# Patient Record
Sex: FEMALE | Race: WHITE | NOT HISPANIC OR LATINO | Employment: OTHER | URBAN - METROPOLITAN AREA
[De-identification: names, ages, dates, MRNs, and addresses within clinical notes are randomized per-mention and may not be internally consistent; named-entity substitution may affect disease eponyms.]

---

## 2017-10-04 ENCOUNTER — HISTORICAL (OUTPATIENT)
Dept: ADMINISTRATIVE | Facility: HOSPITAL | Age: 72
End: 2017-10-04

## 2017-10-04 ENCOUNTER — HISTORICAL (OUTPATIENT)
Dept: FAMILY MEDICINE | Facility: CLINIC | Age: 72
End: 2017-10-04

## 2017-10-04 LAB
ABS NEUT (OLG): 4.83 X10(3)/MCL (ref 2.1–9.2)
ALBUMIN SERPL-MCNC: 3.8 GM/DL (ref 3.4–5)
ALBUMIN/GLOB SERPL: 1 RATIO (ref 1–2)
ALP SERPL-CCNC: 119 UNIT/L (ref 45–117)
ALT SERPL-CCNC: 16 UNIT/L (ref 12–78)
AST SERPL-CCNC: 16 UNIT/L (ref 15–37)
BASOPHILS # BLD AUTO: 0.05 X10(3)/MCL
BASOPHILS NFR BLD AUTO: 1 % (ref 0–1)
BILIRUB SERPL-MCNC: 0.4 MG/DL (ref 0.2–1)
BILIRUBIN DIRECT+TOT PNL SERPL-MCNC: <0.1 MG/DL
BILIRUBIN DIRECT+TOT PNL SERPL-MCNC: >0.3 MG/DL
BUN SERPL-MCNC: 17 MG/DL (ref 7–18)
CALCIUM SERPL-MCNC: 9.1 MG/DL (ref 8.5–10.1)
CHLORIDE SERPL-SCNC: 107 MMOL/L (ref 98–107)
CHOLEST SERPL-MCNC: 192 MG/DL
CHOLEST/HDLC SERPL: 5.2 {RATIO} (ref 0–4.4)
CO2 SERPL-SCNC: 28 MMOL/L (ref 21–32)
CREAT SERPL-MCNC: 0.6 MG/DL (ref 0.6–1.3)
DEPRECATED CALCIDIOL+CALCIFEROL SERPL-MC: 24.51 NG/ML (ref 30–80)
EOSINOPHIL # BLD AUTO: 0.18 X10(3)/MCL
EOSINOPHIL NFR BLD AUTO: 2 % (ref 0–5)
ERYTHROCYTE [DISTWIDTH] IN BLOOD BY AUTOMATED COUNT: 12.7 % (ref 11.5–14.5)
EST. AVERAGE GLUCOSE BLD GHB EST-MCNC: 105 MG/DL
GLOBULIN SER-MCNC: 3.4 GM/ML (ref 2.3–3.5)
GLUCOSE SERPL-MCNC: 76 MG/DL (ref 74–106)
HBA1C MFR BLD: 5.3 % (ref 4.2–6.3)
HCT VFR BLD AUTO: 41.6 % (ref 35–46)
HDLC SERPL-MCNC: 37 MG/DL
HGB BLD-MCNC: 13.9 GM/DL (ref 12–16)
IMM GRANULOCYTES # BLD AUTO: 0.04 10*3/UL
IMM GRANULOCYTES NFR BLD AUTO: 1 %
LDLC SERPL CALC-MCNC: 97 MG/DL (ref 0–130)
LYMPHOCYTES # BLD AUTO: 1.4 X10(3)/MCL
LYMPHOCYTES NFR BLD AUTO: 20 % (ref 15–40)
MCH RBC QN AUTO: 29.9 PG (ref 26–34)
MCHC RBC AUTO-ENTMCNC: 33.4 GM/DL (ref 31–37)
MCV RBC AUTO: 89.5 FL (ref 80–100)
MONOCYTES # BLD AUTO: 0.59 X10(3)/MCL
MONOCYTES NFR BLD AUTO: 8 % (ref 4–12)
NEUTROPHILS # BLD AUTO: 4.83 X10(3)/MCL
NEUTROPHILS NFR BLD AUTO: 68 X10(3)/MCL
PLATELET # BLD AUTO: 181 X10(3)/MCL (ref 130–400)
PMV BLD AUTO: 11.3 FL (ref 7.4–10.4)
POTASSIUM SERPL-SCNC: 3.6 MMOL/L (ref 3.5–5.1)
PROT SERPL-MCNC: 7.2 GM/DL (ref 6.4–8.2)
RBC # BLD AUTO: 4.65 X10(6)/MCL (ref 4–5.2)
SODIUM SERPL-SCNC: 142 MMOL/L (ref 136–145)
TRIGL SERPL-MCNC: 291 MG/DL
TSH SERPL-ACNC: 2.44 MIU/L (ref 0.36–3.74)
VLDLC SERPL CALC-MCNC: 58 MG/DL
WBC # SPEC AUTO: 7.1 X10(3)/MCL (ref 4.5–11)

## 2017-10-05 ENCOUNTER — HISTORICAL (OUTPATIENT)
Dept: RADIOLOGY | Facility: HOSPITAL | Age: 72
End: 2017-10-05

## 2017-12-28 ENCOUNTER — HISTORICAL (OUTPATIENT)
Dept: RADIOLOGY | Facility: HOSPITAL | Age: 72
End: 2017-12-28

## 2018-04-19 ENCOUNTER — HISTORICAL (OUTPATIENT)
Dept: FAMILY MEDICINE | Facility: CLINIC | Age: 73
End: 2018-04-19

## 2019-08-30 ENCOUNTER — HISTORICAL (OUTPATIENT)
Dept: FAMILY MEDICINE | Facility: CLINIC | Age: 74
End: 2019-08-30

## 2019-08-30 LAB
ABS NEUT (OLG): 4.02 X10(3)/MCL (ref 2.1–9.2)
ALBUMIN SERPL-MCNC: 3.7 GM/DL (ref 3.4–5)
ALBUMIN/GLOB SERPL: 1 RATIO (ref 1.1–2)
ALP SERPL-CCNC: 117 UNIT/L (ref 45–117)
ALT SERPL-CCNC: 17 UNIT/L (ref 12–78)
AST SERPL-CCNC: 15 UNIT/L (ref 15–37)
BASOPHILS # BLD AUTO: 0 X10(3)/MCL (ref 0–0.2)
BASOPHILS NFR BLD AUTO: 1 %
BILIRUB SERPL-MCNC: 0.4 MG/DL (ref 0.2–1)
BILIRUBIN DIRECT+TOT PNL SERPL-MCNC: 0.1 MG/DL
BILIRUBIN DIRECT+TOT PNL SERPL-MCNC: 0.3 MG/DL
BUN SERPL-MCNC: 19 MG/DL (ref 7–18)
CALCIUM SERPL-MCNC: 9.4 MG/DL (ref 8.5–10.1)
CHLORIDE SERPL-SCNC: 111 MMOL/L (ref 98–107)
CO2 SERPL-SCNC: 28 MMOL/L (ref 21–32)
CREAT SERPL-MCNC: 0.7 MG/DL (ref 0.6–1.3)
EOSINOPHIL # BLD AUTO: 0.3 X10(3)/MCL (ref 0–0.9)
EOSINOPHIL NFR BLD AUTO: 5 %
ERYTHROCYTE [DISTWIDTH] IN BLOOD BY AUTOMATED COUNT: 13.2 % (ref 11.5–14.5)
GLOBULIN SER-MCNC: 3.6 GM/ML (ref 2.3–3.5)
GLUCOSE SERPL-MCNC: 92 MG/DL (ref 74–106)
HAV IGM SERPL QL IA: NONREACTIVE
HBV CORE IGM SERPL QL IA: NONREACTIVE
HBV SURFACE AG SERPL QL IA: NEGATIVE
HCT VFR BLD AUTO: 43.4 % (ref 35–46)
HCV AB SERPL QL IA: NONREACTIVE
HGB BLD-MCNC: 14.1 GM/DL (ref 12–16)
HIV 1+2 AB+HIV1 P24 AG SERPL QL IA: NONREACTIVE
IMM GRANULOCYTES # BLD AUTO: 0.02 10*3/UL
IMM GRANULOCYTES NFR BLD AUTO: 0 %
LYMPHOCYTES # BLD AUTO: 1.3 X10(3)/MCL (ref 0.6–4.6)
LYMPHOCYTES NFR BLD AUTO: 21 %
MCH RBC QN AUTO: 29.6 PG (ref 26–34)
MCHC RBC AUTO-ENTMCNC: 32.5 GM/DL (ref 31–37)
MCV RBC AUTO: 91 FL (ref 80–100)
MONOCYTES # BLD AUTO: 0.5 X10(3)/MCL (ref 0.1–1.3)
MONOCYTES NFR BLD AUTO: 9 %
NEG CONT SPOT COUNT: NORMAL
NEUTROPHILS # BLD AUTO: 4.02 X10(3)/MCL (ref 2.1–9.2)
NEUTROPHILS NFR BLD AUTO: 65 %
PANEL A SPOT COUNT: 0
PANEL B SPOT COUNT: 0
PLATELET # BLD AUTO: 178 X10(3)/MCL (ref 130–400)
PMV BLD AUTO: 11.1 FL (ref 7.4–10.4)
POS CONT SPOT COUNT: NORMAL
POTASSIUM SERPL-SCNC: 4 MMOL/L (ref 3.5–5.1)
PROT SERPL-MCNC: 7.3 GM/DL (ref 6.4–8.2)
RBC # BLD AUTO: 4.77 X10(6)/MCL (ref 4–5.2)
SODIUM SERPL-SCNC: 144 MMOL/L (ref 136–145)
T-SPOT.TB: NORMAL
WBC # SPEC AUTO: 6.2 X10(3)/MCL (ref 4.5–11)

## 2020-08-25 ENCOUNTER — HISTORICAL (OUTPATIENT)
Dept: FAMILY MEDICINE | Facility: CLINIC | Age: 75
End: 2020-08-25

## 2020-08-25 LAB
ALBUMIN SERPL-MCNC: 3.5 GM/DL (ref 3.4–5)
ALBUMIN/GLOB SERPL: 1 RATIO (ref 1.1–2)
ALP SERPL-CCNC: 100 UNIT/L (ref 45–117)
ALT SERPL-CCNC: 16 UNIT/L (ref 12–78)
AST SERPL-CCNC: 12 UNIT/L (ref 15–37)
BILIRUB SERPL-MCNC: 0.4 MG/DL (ref 0.2–1)
BILIRUBIN DIRECT+TOT PNL SERPL-MCNC: 0.1 MG/DL (ref 0–0.2)
BILIRUBIN DIRECT+TOT PNL SERPL-MCNC: 0.3 MG/DL
BUN SERPL-MCNC: 23 MG/DL (ref 7–18)
CALCIUM SERPL-MCNC: 9 MG/DL (ref 8.5–10.1)
CHLORIDE SERPL-SCNC: 109 MMOL/L (ref 98–107)
CHOLEST SERPL-MCNC: 208 MG/DL
CHOLEST/HDLC SERPL: 5.2 {RATIO} (ref 0–4.4)
CO2 SERPL-SCNC: 26 MMOL/L (ref 21–32)
CREAT SERPL-MCNC: 0.7 MG/DL (ref 0.6–1.3)
GLOBULIN SER-MCNC: 3.4 GM/ML (ref 2.3–3.5)
GLUCOSE SERPL-MCNC: 93 MG/DL (ref 74–106)
HDLC SERPL-MCNC: 40 MG/DL (ref 40–59)
LDLC SERPL CALC-MCNC: 119 MG/DL
POTASSIUM SERPL-SCNC: 4 MMOL/L (ref 3.5–5.1)
PROT SERPL-MCNC: 6.9 GM/DL (ref 6.4–8.2)
SODIUM SERPL-SCNC: 141 MMOL/L (ref 136–145)
TRIGL SERPL-MCNC: 246 MG/DL
VLDLC SERPL CALC-MCNC: 49 MG/DL

## 2020-09-17 ENCOUNTER — HISTORICAL (OUTPATIENT)
Dept: ADMINISTRATIVE | Facility: HOSPITAL | Age: 75
End: 2020-09-17

## 2020-09-17 LAB
ABS NEUT (OLG): 4.09 X10(3)/MCL (ref 2.1–9.2)
ALBUMIN SERPL-MCNC: 3.9 GM/DL (ref 3.4–5)
ALBUMIN/GLOB SERPL: 1.2 RATIO (ref 1.1–2)
ALP SERPL-CCNC: 120 UNIT/L (ref 45–117)
ALT SERPL-CCNC: 17 UNIT/L (ref 12–78)
AST SERPL-CCNC: 17 UNIT/L (ref 15–37)
BASOPHILS # BLD AUTO: 0.1 X10(3)/MCL (ref 0–0.2)
BASOPHILS NFR BLD AUTO: 1 %
BILIRUB SERPL-MCNC: 0.4 MG/DL (ref 0.2–1)
BILIRUBIN DIRECT+TOT PNL SERPL-MCNC: 0.1 MG/DL (ref 0–0.2)
BILIRUBIN DIRECT+TOT PNL SERPL-MCNC: 0.3 MG/DL
BUN SERPL-MCNC: 26 MG/DL (ref 7–18)
CALCIUM SERPL-MCNC: 9.3 MG/DL (ref 8.5–10.1)
CHLORIDE SERPL-SCNC: 108 MMOL/L (ref 98–107)
CO2 SERPL-SCNC: 24 MMOL/L (ref 21–32)
CREAT SERPL-MCNC: 0.7 MG/DL (ref 0.6–1.3)
CRP SERPL-MCNC: 0.8 MG/DL
EOSINOPHIL # BLD AUTO: 0.2 X10(3)/MCL (ref 0–0.9)
EOSINOPHIL NFR BLD AUTO: 4 %
ERYTHROCYTE [DISTWIDTH] IN BLOOD BY AUTOMATED COUNT: 12.9 % (ref 11.5–14.5)
ERYTHROCYTE [SEDIMENTATION RATE] IN BLOOD: 5 MM/HR (ref 0–20)
GLOBULIN SER-MCNC: 3.3 GM/ML (ref 2.3–3.5)
GLUCOSE SERPL-MCNC: 91 MG/DL (ref 74–106)
HAV IGM SERPL QL IA: NONREACTIVE
HBV CORE IGM SERPL QL IA: NONREACTIVE
HBV SURFACE AG SERPL QL IA: NONREACTIVE
HCT VFR BLD AUTO: 42.9 % (ref 35–46)
HCV AB SERPL QL IA: NONREACTIVE
HGB BLD-MCNC: 13.7 GM/DL (ref 12–16)
HIV 1+2 AB+HIV1 P24 AG SERPL QL IA: NONREACTIVE
IMM GRANULOCYTES # BLD AUTO: 0.03 10*3/UL
IMM GRANULOCYTES NFR BLD AUTO: 0 %
LYMPHOCYTES # BLD AUTO: 1.1 X10(3)/MCL (ref 0.6–4.6)
LYMPHOCYTES NFR BLD AUTO: 18 %
MCH RBC QN AUTO: 28.8 PG (ref 26–34)
MCHC RBC AUTO-ENTMCNC: 31.9 GM/DL (ref 31–37)
MCV RBC AUTO: 90.3 FL (ref 80–100)
MONOCYTES # BLD AUTO: 0.7 X10(3)/MCL (ref 0.1–1.3)
MONOCYTES NFR BLD AUTO: 11 %
NEG CONT SPOT COUNT: NORMAL
NEUTROPHILS # BLD AUTO: 4.09 X10(3)/MCL (ref 2.1–9.2)
NEUTROPHILS NFR BLD AUTO: 66 %
PANEL A SPOT COUNT: 0
PANEL B SPOT COUNT: 0
PLATELET # BLD AUTO: 185 X10(3)/MCL (ref 130–400)
PMV BLD AUTO: 11.9 FL (ref 7.4–10.4)
POS CONT SPOT COUNT: NORMAL
POTASSIUM SERPL-SCNC: 4.1 MMOL/L (ref 3.5–5.1)
PROT SERPL-MCNC: 7.2 GM/DL (ref 6.4–8.2)
RBC # BLD AUTO: 4.75 X10(6)/MCL (ref 4–5.2)
SODIUM SERPL-SCNC: 141 MMOL/L (ref 136–145)
T PALLIDUM AB SER QL: NONREACTIVE
T-SPOT.TB: NORMAL
WBC # SPEC AUTO: 6.2 X10(3)/MCL (ref 4.5–11)

## 2022-04-10 ENCOUNTER — HISTORICAL (OUTPATIENT)
Dept: ADMINISTRATIVE | Facility: HOSPITAL | Age: 77
End: 2022-04-10
Payer: MEDICARE

## 2022-04-26 VITALS
SYSTOLIC BLOOD PRESSURE: 135 MMHG | HEIGHT: 61 IN | WEIGHT: 138.88 LBS | BODY MASS INDEX: 26.22 KG/M2 | OXYGEN SATURATION: 96 % | DIASTOLIC BLOOD PRESSURE: 83 MMHG

## 2022-05-03 NOTE — HISTORICAL [facility] CERNER
This is a historical note converted from [employer]. Formatting and pictures may have been removed.  Please reference [place] for original formatting and attached multimedia. Chief Complaint  p[soriasis  History of Present Illness  75-year-old female with psoriasis and psoriatic arthritis presents for follow up, last seen on 7/2020. Was previously on Otezla but stopped due to GI side effects. Started Accitretin 10 mg daily but had to stop due to medication making her break out.? Currently on betamethasone 0.05% topical ointment?and mupirocin?3 times daily. States her psoriasis has worsened over her hands with drying and cracking skin since stopping Otezla. She would like to discuss other alternatives for treatment. Denies chest pain, SOB, fever, chills. Does also note vaginal dryness.  Review of Systems  Constitutional: no fever, no chills, no weight loss  CV: no swelling, no edema, no chest pain, no palpitations  ENT: no cough, no nasal congestion  : +urinary incontinence and vaginal dryness  GI: +constipation, no diarrhea, no nausea, no vomiting  Resp: no SOB, no wheezing, no difficulty breathing  Skin: see HPI  MSK: see HPI  Psych: no depression, no anxiety  Physical Exam  Vitals & Measurements  T:?37? ?C (Oral)? HR:?67(Peripheral)? RR:?18? BP:?117/73? SpO2:?100%?  HT:?160.00?cm? WT:?65.700?kg? BMI:?25.66?  Skin: psoriatic dermatitis?involvement on bilateral thumbs, index fingers tips, nails, and flexor surfaces, skin is cracking and peeling over fingertips, deformities and tenderness noted over PIP, DIP, MTP  Resp:?nonlabored breathing, equal chest expansion  Psych: appropriate mood and affect  Assessment/Plan  1.?Psoriatic arthritis?L40.50  Continue?betamethasone 0.05% topical ointment?and mupirocin?3 times daily  - Can increase betamethasone to TID application with use of Vaseline in between  - TTG IgA negative, low suspicion for celiac disease  - Given patient failed Otezla, Acitretin discussed with patient use  of biologic such as Humira. Answered patient questions regarding risk benefits including risk of SLE, infection, infection site reaction, increased risk of cancer. Patient elects to read about medication and will do nurse visit for blood work if she decides to proceed with medication so that she can do pre-work up for medication.  - Declines referral for rheumatologist- states she does not wish to see one. Elects to see only dermatology. In light of this, have added RA labs to work up for patient.?  ?  Ordered:  Clinic Follow up, *Est. 12/17/20 3:00:00 CST, face to face for derm visit, Order for future visit, Psoriatic arthritis, [facility]  ?  Referrals  Clinic Follow up, *Est. 12/17/20 3:00:00 CST, face to face for derm visit, Order for future visit, Psoriatic arthritis, [facility]  Nurse Visit, *Est. 09/17/20 3:00:00 CDT, for lab work. Does not want to go to regular Lab, Order for future visit, Psoriatic arthritis  Psoriasis, [facility]   Procedure/Surgical History  Abdominal hysterectomy  Suspension of bladder   Medications  albuterol-ipratropium 2.5 mg-0.5 mg/3 mL inhalation solution, 3 mL, NEB, QID  Aspir 81 oral delayed release tablet, 81 mg= 1 tab(s), Oral, Daily, 4 refills  betamethasone dipropionate 0.05% topical ointment, See Instructions, 3 refills  Caltrate 600 + D  cyclobenzaprine 5 mg oral tablet, 5 mg= 1 tab(s), Oral, qPM  escitalopram 10 mg oral tablet, See Instructions  esomeprazole 40 mg oral DR capsule, See Instructions  Eucerin Plus Intensive Repair lotion, 1 application, TOP, BID  Flonase 50 mcg/inh nasal spray, 1 spray(s), Nasal, Daily, 4 refills,? ?Still taking, not as prescribed: prn  mupirocin 2% topical oint, See Instructions, 3 refills  nabumetone 750 mg oral tablet, 750 mg= 1 tab(s), Oral, BID  Premarin 0.45 mg oral tablet, See Instructions  Allergies  No Known Allergies  Family History  Diabetes mellitus type 2: Mother.      agree with  above, seen with resident, inflammatory arthritis-swollen tender MCPs, psoriasiform hand eczema-c/w psoriasis and psoriatic arthritis; did not tolerate GI s/e of otezla, pt screened for and discussed s/e of humira in detail with pt, she states she will research further and get lab work done if she decides to proceed with medication; also planned for rheumatology referral which pt defers at this time, will include rheumatology panel, pt has daughter with systemic lupus

## 2022-05-03 NOTE — HISTORICAL [facility] CERNER
This is a historical note converted from [employer]. Formatting and pictures may have been removed.  Please reference [place] for original formatting and attached multimedia. Chief Complaint  needs refills, check up  History of Present Illness  73yo WF presents for scheduled follow up  Complains of intermittent crampy abdominal/side pain on the right side, comes and goes, not related to food intake, denies bloating, not related to BM, described as a sharp pain under her right breast then travels to side/back, nothing seems to make the pain any better or worse, denies any cough- no associated shortness of breath ???  GERD- well controlled on Nexium- patient has tried multiple other medications for this condition and had poor response- PA has been completed on multiple occasions and she is able to get the medication  COPD- stable with Symbicort- no cough or dyspnea, breathing improved after smoking cessation  Vaginal Dryness/Hot flashes-?has been taking ?Premarin for more than 10 years- understands the risks this medication has on her health including increased risk of heart disease,stroke,heart attack,?and blood clots. s/p Hysterectomy ,willing to go to every other day  Tobacco Use- has stopped Chantix - has not smoked in 3 months  Cervical Radiculopathy a taking Naproxen PRN, has been doing daily exercise  Breast Rash- noticed several days ago- under left breast- red and itchy  Also complains of lesion to right temple- seems to be getting bigger  All vaccines UTD?gets them at [facility].  Review of Systems  Negative except those mentioned in HPI.  Physical Exam  Vitals & Measurements  T:?36.7? ?C ?(Oral)? HR:?64?(Peripheral)? RR:?18? BP:?126/78? WT:?69.35?kg? WT:?69.35?kg?  General: ?Alert and oriented, No acute distress. ?  Eye: ?Extraocular movements are intact, Normal conjunctiva. ?  HENT: ?Normal hearing, Oral mucosa is moist, 1cm circular lesion, raised, brown with stuck on appearance- no erythema or discharge right  forehead ??  Neck: ?Supple, No carotid bruit, No lymphadenopathy, a spinal or paraspinal tenderness, normal ROM ?  Respiratory: ?Lungs are clear to auscultation, Respirations are non-labored, Breath sounds are equal. ?  Cardiovascular: ?Normal rate, Regular rhythm, No murmur, No gallop. ?  Chest:Non-tender, no deformity  Breast Exam: no masses, tenderness, nipple inversion, or asymmetry, erythematous rash consistent with Candidal infection present under right breast  Gastrointestinal: ?Soft, Non-tender, Normal bowel sounds. ?  Musculoskeletal: ?Normal range of motion, Normal strength, Normal gait. ?  Integumentary: ?Warm, Dry, Intact, No rash  Neurologic: ?Alert, Oriented, Normal sensory, Normal motor function, No focal deficits  Cognition and Speech: ?Speech clear and coherent. ?  Psychiatric: ?Appropriate mood & affect.??  Assessment/Plan  Cervical radiculopathy  ?- stable, continue Naproxen PRN  Chest pain  - EKG performed today, sinus bradycardia, no previous EKG on file ?  - given patients intermittent symptoms and smoking history,and long term Premarin use?- will obtain CXR and Abdominal XR today, may also consider stress test  ER precautions reviewed  COPD without exacerbation  ?- no recent exacerbations, continue Symbicort BID, Albuterol PRN  Former tobacco use  ?- encouraged patient to continue smoking cessation  Fungal infection  ?- under right breast, Rx for Nystatin cream provided  GERD (gastroesophageal reflux disease)  ?- symptoms controlled with Nexium  Keratosis seborrheica  ?- right temple, cryotherapy applied to lesion today, will repeat if necessary  Low vitamin D level  ?- repeat Vit D level today  Menopausal symptom  ?Vaginal Dryness/Hot flashes-?has been taking ?Premarin for more than 10 years- understands the risks this medication has on her health including increased risk of heart disease,stroke,heart attack,?and blood clots. s/p Hysterectomy ,willing to go to every other day  RTC 1 month to  follow up on Abdominal/Chest Pain, annual labs ordered today- will call patient with results.   Problem List/Past Medical History  Ongoing  Tobacco user  Historical  Procedure/Surgical History  Abdominal hysterectomy  Suspension of bladder  Medications  Inpatient  No active inpatient medications  Home  Aspir 81 oral delayed release tablet, 81 mg= 1 tab(s), Oral, Daily  Caltrate 600 + D  Chantix 1 mg oral tablet, See Instructions, 1 refills  clobetasol 0.05% topical cream, 1 james, TOP, BID, 1 refills  conjugated estrogens 0.625 mg oral tablet, 0.625 mg= 1 tab(s), Oral, Daily, 3 refills  Flonase 50 mcg/inh nasal spray, 1 spray(s), Nasal, Daily, 1 refills,? ?Not taking  NexIUM 40 mg oral delayed release capsule, 40 mg= 1 cap(s), Oral, Daily, 3 refills  Symbicort 80 mcg-4.5 mcg/inh inhalation aerosol, 2 puff(s), INH, BID, 3 refills  Allergies  No Known Allergies  Social History  Alcohol - 10/04/2017  Past  Employment/School - 10/04/2017  Retired, Unemployed  Home/Environment - 10/04/2017  Lives with Alone. Living situation: Home/Independent.  Nutrition/Health - 10/04/2017  Regular, Wants to lose weight: Yes. Sleeping concerns: No. Feels highly stressed: Yes.  Substance Abuse - 10/04/2017  Never  Tobacco - 10/04/2017  Former smoker  Current every day smoker  Current every day smoker  Immunizations  UTD      I was present with the resident during the history and exam.  ???  [ x ] I discussed the case with the resident and agree with the findings and plan as documented in the residents note.

## 2022-07-01 ENCOUNTER — OFFICE VISIT (OUTPATIENT)
Dept: DERMATOLOGY | Facility: CLINIC | Age: 77
End: 2022-07-01
Payer: MEDICARE

## 2022-07-01 VITALS
OXYGEN SATURATION: 99 % | SYSTOLIC BLOOD PRESSURE: 135 MMHG | BODY MASS INDEX: 26.22 KG/M2 | TEMPERATURE: 98 F | RESPIRATION RATE: 18 BRPM | HEIGHT: 61 IN | WEIGHT: 138.88 LBS | HEART RATE: 82 BPM | DIASTOLIC BLOOD PRESSURE: 82 MMHG

## 2022-07-01 DIAGNOSIS — L82.0 SEBORRHEIC KERATOSIS, INFLAMED: ICD-10-CM

## 2022-07-01 DIAGNOSIS — L40.50 PSORIATIC ARTHRITIS: Primary | ICD-10-CM

## 2022-07-01 PROCEDURE — 99213 OFFICE O/P EST LOW 20 MIN: CPT | Mod: PBBFAC | Performed by: DERMATOLOGY

## 2022-07-01 PROCEDURE — 17110 DESTRUCTION B9 LES UP TO 14: CPT | Mod: PBBFAC | Performed by: STUDENT IN AN ORGANIZED HEALTH CARE EDUCATION/TRAINING PROGRAM

## 2022-07-01 RX ORDER — MUPIROCIN 20 MG/G
OINTMENT TOPICAL
Qty: 22 G | Refills: 3 | Status: SHIPPED | OUTPATIENT
Start: 2022-07-01

## 2022-07-01 RX ORDER — BETAMETHASONE DIPROPIONATE 0.5 MG/G
OINTMENT TOPICAL 2 TIMES DAILY
Qty: 45 G | Refills: 3 | Status: SHIPPED | OUTPATIENT
Start: 2022-07-01 | End: 2023-05-12 | Stop reason: SDUPTHER

## 2022-07-01 RX ORDER — USTEKINUMAB 45 MG/.5ML
45 INJECTION, SOLUTION SUBCUTANEOUS
Refills: 3 | COMMUNITY
Start: 2022-05-02 | End: 2022-07-01 | Stop reason: SDUPTHER

## 2022-07-01 RX ORDER — MUPIROCIN 20 MG/G
OINTMENT TOPICAL
COMMUNITY
Start: 2021-09-09 | End: 2022-07-01 | Stop reason: SDUPTHER

## 2022-07-01 RX ORDER — BETAMETHASONE DIPROPIONATE 0.5 MG/G
OINTMENT TOPICAL 2 TIMES DAILY
COMMUNITY
Start: 2022-01-28 | End: 2022-07-01 | Stop reason: SDUPTHER

## 2022-07-01 RX ORDER — USTEKINUMAB 45 MG/.5ML
45 INJECTION, SOLUTION SUBCUTANEOUS
Qty: 0.5 ML | Refills: 3 | Status: SHIPPED | OUTPATIENT
Start: 2022-07-01 | End: 2023-05-12 | Stop reason: SDUPTHER

## 2022-07-01 NOTE — PROGRESS NOTES
"[facility] Visit    Chief Complaint:      Psoriasis     Subjective:     HPI:  Cary Quintero is a 77 y.o. female who  has no past medical history on file.  She presents to Dermatology clinic today for follow-up Psoriasis.  Patient has history of psoriatic arthritis was switched to stelara at last visit reports better control today.  Only reports 1 episode of plaque formation located on right lateral 5th digit.  She is requesting cryotherapy for multiple skin tags noted around neck and shoulders.       Review of Systems  A comprehensive 12 point review of systems was completed.  Please see above for pertinent positives and negatives.     Objective:   Last 24 Hour Vital Signs:  Vitals  BP: 135/82  Temp: 97.9 °F (36.6 °C)  Temp src: Oral  Pulse: 82  Resp: 18  SpO2: 99 %  Height: 5' 1.02" (155 cm)  Weight: 63 kg (138 lb 14.2 oz)    Physical Examination:  General: Awake, alert, & oriented to person, place & time. No acute distress  Psychiatric: Mood and affect normal  HEENT: Normocephalic, atraumatic. Face symmetric.  Cardiovascular: Regular rate & rhythm.  Pulmonary: Bilateral symmetric chest rise. Non-labored  Abdominal:  Soft, nontender, nondistended.  Extremities: No clubbing, cyanosis or edema.  Rheumatological nodules are noted bilateral hands as well as joint deformities  Skin:  Exposed skin is warm & dry.  One area of psoriatic plaque is noted on lateral right 5th digit  Neuro:   Patient moves all extremities equally. Sensation intact bilateraly.    Assessment & Plan:     Psoriasis  Psoriatic arthritis   -continue Stelara q.3 months  -continue mupirocin   -continue betamethasone  -patient offered the option to start methotrexate at this visit however she is denied, can readdress at next visit.  Continues to report joint pain including knees, was referred to rheumatology at last visit has not received appointment yet.    Lichen sclerosus   -evaluated in clinic at last visit by attending physician with " chaperone (female medical student) in room  -at that time no evidence of malignancy, prescribe Protopic 0.1 % ointment to be applied BID prn  -instructed to f/u with GYN    Inflamed seborrheic keratosis  -cryotherapy performed today      To be addressed at next follow-up  -none      Follow-ups  -Follow-up in 6 months      Cryotherapy Procedure Note    Pre-operative Diagnosis:  Inflamed seborrheic keratosis    Post-operative Diagnosis: Inflamed seborrheic keratosis    Locations: anterior trunk    Indications: Itching    Anesthesia: not required without added sodium bicarbonate    Procedure Details   Patient informed of risks (permanent scarring, infection, light or dark discoloration, bleeding, infection, weakness, numbness and recurrence of the lesion) and benefits of the procedure and written informed consent obtained.    The areas are treated with liquid nitrogen therapy, frozen until ice ball extended 1 mm beyond lesion, allowed to thaw, and treated again. The patient tolerated procedure well.  The patient was instructed on post-op care, warned that there may be blister formation, redness and pain. Recommend OTC analgesia as needed for pain.    Condition:  Stable    Complications:  none.    Plan:  1. Instructed to keep the area dry and covered for 24-48h and clean thereafter.  2. Warning signs of infection were reviewed.      Madan Pearson MD  [facility] - PGY-2

## 2022-09-16 ENCOUNTER — TELEPHONE (OUTPATIENT)
Dept: FAMILY MEDICINE | Facility: CLINIC | Age: 77
End: 2022-09-16
Payer: MEDICARE

## 2022-09-16 NOTE — TELEPHONE ENCOUNTER
Pt left VM, c/o hands are really bad and she is not sure what she should do. Pt requested call back from you. Thank you!

## 2023-05-12 ENCOUNTER — OFFICE VISIT (OUTPATIENT)
Dept: DERMATOLOGY | Facility: CLINIC | Age: 78
End: 2023-05-12
Payer: MEDICARE

## 2023-05-12 VITALS
WEIGHT: 138 LBS | RESPIRATION RATE: 16 BRPM | TEMPERATURE: 97 F | BODY MASS INDEX: 26.06 KG/M2 | SYSTOLIC BLOOD PRESSURE: 124 MMHG | OXYGEN SATURATION: 95 % | HEIGHT: 61 IN | DIASTOLIC BLOOD PRESSURE: 84 MMHG | HEART RATE: 65 BPM

## 2023-05-12 DIAGNOSIS — L40.50 PSORIATIC ARTHRITIS: ICD-10-CM

## 2023-05-12 DIAGNOSIS — A19.2 TUBERCULOSIS GENERALIZED (EXCLUDING R70): Primary | ICD-10-CM

## 2023-05-12 PROCEDURE — 36415 COLL VENOUS BLD VENIPUNCTURE: CPT | Performed by: DERMATOLOGY

## 2023-05-12 PROCEDURE — 86480 TB TEST CELL IMMUN MEASURE: CPT | Mod: 90 | Performed by: DERMATOLOGY

## 2023-05-12 PROCEDURE — 99213 OFFICE O/P EST LOW 20 MIN: CPT | Mod: PBBFAC | Performed by: DERMATOLOGY

## 2023-05-12 PROCEDURE — 17000 DESTRUCT PREMALG LESION: CPT | Mod: PBBFAC

## 2023-05-12 RX ORDER — USTEKINUMAB 45 MG/.5ML
45 INJECTION, SOLUTION SUBCUTANEOUS
Qty: 0.5 ML | Refills: 3 | Status: SHIPPED | OUTPATIENT
Start: 2023-05-12 | End: 2023-09-15

## 2023-05-12 RX ORDER — BETAMETHASONE DIPROPIONATE 0.5 MG/G
OINTMENT TOPICAL 2 TIMES DAILY
Qty: 45 G | Refills: 3 | Status: SHIPPED | OUTPATIENT
Start: 2023-05-12 | End: 2024-05-11

## 2023-05-12 NOTE — PROGRESS NOTES
Patient seen and examined with resident, agree with plan as outlined. Pt deferred exam of vulvar/vaginal area/states will follow up with gyn and area doing well, no changes.    CORRECTION: Patient does not have tuberculosis; TB screening testing done as patient is on abidara      Karthik Shetty MD  [facility] - Dermatology

## 2023-05-12 NOTE — PROGRESS NOTES
"[facility] Visit    Chief Complaint:      Medication Refill (STELARA, BETAMETHASONE, BACTROBAN REFILLS)     Subjective:     HPI:  Cary Quintero is a 78 y.o. female who  has no past medical history on file.  She presents to Dermatology clinic today for follow-up Medication Refill (STELARA, BETAMETHASONE, BACTROBAN REFILLS).  Patient has history of psoriatic arthritis previously taking stelara and using diprolene ointment for treatment.  She states her symptoms were well controlled on stelara injections but she stopped receiving them approxx 3 months ago because she was completely symptom free at that time.  She noticed new plaque formations beginning about 3 weeks ago and she is now concerned she may be going into a new flare.  She is not currently using anything for treatment.  The plaques are mostly present over left hand, the largest of which is located over left thumb and is currently cracked and erythematous.  She denies any associated pruritis but states they are painful and associated with joint pain and swelling.  She has a hx of bilateral plaque formation over bilateral hands and states she is starting to notice lesions present over her right hand as well.  She is interested in restarting stelara injections today and is requesting a refill on her betamethasone cream.       Review of Systems  A comprehensive 12 point review of systems was completed.  Please see above for pertinent positives and negatives.     Objective:   Last 24 Hour Vital Signs:  Vitals  BP: 124/84  Temp: 97.4 °F (36.3 °C)  Temp Source: Oral  Pulse: 65  Resp: 16  SpO2: 95 %  Height: 5' 1" (154.9 cm)  Weight: 62.6 kg (138 lb)    Physical Examination:  General: Awake, alert, & oriented to person, place & time. No acute distress  Psychiatric: Mood and affect normal  HEENT: Normocephalic, atraumatic. Face symmetric.  Cardiovascular: Regular rate & rhythm.  Pulmonary: Bilateral symmetric chest rise. Non-labored  Abdominal:  Soft, " nontender, nondistended.  Extremities: No clubbing, cyanosis or edema.  Rheumatological nodules are noted bilateral hands as well as joint deformities  Skin:  Exposed skin is warm & dry.  One area of psoriatic plaque is noted on lateral right 5th digit   Neuro:   Patient moves all extremities equally. Sensation intact bilateraly.    Assessment & Plan:     Psoriasis  Psoriatic arthritis   -will restart Stelara q.3 months  -will performed quantiferon gold test today to r/o TB before restarting immunomodulatory medication   -hepatitis panel negative in 2020   -continue mupirocin   -continue betamethasone (refilled today)  -patient offered the option to start methotrexate at prior visit however she denied, can readdress at next visit.    -patient is not interested in following with rheumatology at this time     Lichen sclerosus   -evaluated in clinic at prior visit by attending physician with chaperone (female medical student) in room  -at that time no evidence of malignancy, prescribe Protopic 0.1 % ointment to be applied BID prn  -following with GYN     Inflamed seborrheic keratosis underneath right eye   -cryotherapy performed today    -Follow-up in 3 months    Esperanza Watt MD  [facility], HO-1

## 2023-05-16 LAB
GAMMA INTERFERON BACKGROUND BLD IA-ACNC: 0.01 IU/ML
M TB IFN-G BLD-IMP: NEGATIVE
M TB IFN-G CD4+ BCKGRND COR BLD-ACNC: 0 IU/ML
M TB IFN-G CD4+CD8+ BCKGRND COR BLD-ACNC: 0 IU/ML
MITOGEN IGNF BCKGRD COR BLD-ACNC: 6.39 IU/ML

## 2023-09-15 ENCOUNTER — OFFICE VISIT (OUTPATIENT)
Dept: DERMATOLOGY | Facility: CLINIC | Age: 78
End: 2023-09-15
Payer: MEDICARE

## 2023-09-15 VITALS
BODY MASS INDEX: 26.06 KG/M2 | DIASTOLIC BLOOD PRESSURE: 79 MMHG | OXYGEN SATURATION: 96 % | TEMPERATURE: 98 F | SYSTOLIC BLOOD PRESSURE: 113 MMHG | HEIGHT: 61 IN | WEIGHT: 138 LBS | HEART RATE: 63 BPM | RESPIRATION RATE: 18 BRPM

## 2023-09-15 DIAGNOSIS — L40.50 PSORIATIC ARTHRITIS: Primary | ICD-10-CM

## 2023-09-15 DIAGNOSIS — L82.1 SEBORRHEIC KERATOSES: ICD-10-CM

## 2023-09-15 DIAGNOSIS — L40.9 PSORIASIS: ICD-10-CM

## 2023-09-15 DIAGNOSIS — L90.0 LICHEN SCLEROSUS: ICD-10-CM

## 2023-09-15 PROCEDURE — 17110 DESTRUCTION B9 LES UP TO 14: CPT | Mod: PBBFAC | Performed by: STUDENT IN AN ORGANIZED HEALTH CARE EDUCATION/TRAINING PROGRAM

## 2023-09-15 PROCEDURE — 99213 OFFICE O/P EST LOW 20 MIN: CPT | Mod: PBBFAC | Performed by: DERMATOLOGY

## 2023-09-15 RX ORDER — IXEKIZUMAB 80 MG/ML
INJECTION, SOLUTION SUBCUTANEOUS
Qty: 6 EACH | Refills: 6 | Status: SHIPPED | OUTPATIENT
Start: 2023-09-15

## 2023-09-15 NOTE — PROGRESS NOTES
Patient seen and examined with resident, agree with plan as outlined. Taltz s/e discussed and pt elects to move forward with tx      Karthik Shetty MD  [facility] - Dermatology

## 2023-09-15 NOTE — PROGRESS NOTES
"[facility] Visit Note    Chief Complaint:      Follow-up (3 month follow up) and Mole (On scalp)     Subjective:     HPI:  Cary Quintero is a 78 y.o. female who  has no past medical history on file.  She presents to Dermatology clinic today for follow-up Follow-up (3 month follow up) and Mole (On scalp).  Patient has history of psoriatic arthritis and psoriasis.  Last seen 05/2023, currently on Stelara Q 3 months as well as at needed betamethasone dipropionate ointment and Protopic ointment for skin lesions without any significant improvement.  She reports that her skin lesions on her fingertips or causes significant pain and pruritus when they are dry and cracking.  She also has lichen sclerosis groin, followed by Gynecology she reports stable at this time.    Review of Systems  See HPI    Objective:   Last 24 Hour Vital Signs:  Vitals  BP: 113/79  Temp: 98.2 °F (36.8 °C)  Temp Source: Oral  Pulse: 63  Resp: 18  SpO2: 96 %  Height: 5' 1" (154.9 cm)  Weight: 62.6 kg (138 lb)    Physical Examination:  General: Awake, alert, & oriented to person, place & time. No acute distress  Psychiatric: Mood and affect normal  HEENT: Normocephalic, atraumatic. Face symmetric.  Cardiovascular: Regular rate & rhythm.  Pulmonary: Bilateral symmetric chest rise. Non-labored  Abdominal:  Soft, nontender, nondistended.  Extremities: No clubbing, cyanosis or edema.  Rheumatological nodules are noted bilateral hands as well as joint deformities  Skin:  Exposed skin is warm & dry.  Areas of psoriatic plaque is noted on tips of right 2nd, 4th, and 5th digit as well as intertriginous areas including below bilateral breast and gluteal cleft. Multiple seborrheic keratoses forehead, neck, and upper chest.  Neuro:   Patient moves all extremities equally. Sensation intact bilateraly.    Procedure:  Cryo-destruction of Benign Lesion  Diagnosis:  Seborrheic Keratoses  Performing Physician: Walt Sylvester MD  Supervising Physician: Karthik " MD Diogenes  Permit:  Signed informed consent  Details:  Cryo-gun used for 3 freeze/thaw cycle of 10 lesions  Complications: None  Routine post cryo instructions given        Assessment & Plan:     Psoriasis/ Inverse Psoriasis  Psoriatic arthritis   - Treatment failure with Stelara, betamethasone, tacrolimus greater than 1 year  - Effecting joints of hands and roughly 10% BSA  - TB test negative 5/12/23  - Hepatitis panel negative in 2020   - Continue mupirocin   - Continue betamethasone  - Will start Taltz q 2 weeks x 12 weeks then monthly  - Will send referral to Rheumatology    Lichen sclerosus   - Follows with GYN, stable  - Continue current therapy    Seborrheic keratoses   - Cryotherapy performed today 10 skin lesions    - Case, Assessment and Plan discussed with Dr. Karthik Shetty. Follow-up in 3 months    Walt Sylvester MD  [employer]  Resident HO-3